# Patient Record
Sex: MALE | Race: WHITE | HISPANIC OR LATINO
[De-identification: names, ages, dates, MRNs, and addresses within clinical notes are randomized per-mention and may not be internally consistent; named-entity substitution may affect disease eponyms.]

---

## 2020-02-27 PROBLEM — Z00.00 ENCOUNTER FOR PREVENTIVE HEALTH EXAMINATION: Status: ACTIVE | Noted: 2020-02-27

## 2020-03-09 ENCOUNTER — APPOINTMENT (OUTPATIENT)
Dept: NEUROSURGERY | Facility: CLINIC | Age: 65
End: 2020-03-09
Payer: MEDICAID

## 2020-03-09 VITALS
RESPIRATION RATE: 16 BRPM | HEART RATE: 70 BPM | BODY MASS INDEX: 26.04 KG/M2 | SYSTOLIC BLOOD PRESSURE: 140 MMHG | OXYGEN SATURATION: 95 % | HEIGHT: 71 IN | TEMPERATURE: 98.1 F | WEIGHT: 186 LBS | DIASTOLIC BLOOD PRESSURE: 79 MMHG

## 2020-03-09 DIAGNOSIS — H54.40 BLINDNESS, ONE EYE, UNSPECIFIED EYE: ICD-10-CM

## 2020-03-09 DIAGNOSIS — Z86.19 PERSONAL HISTORY OF OTHER INFECTIOUS AND PARASITIC DISEASES: ICD-10-CM

## 2020-03-09 DIAGNOSIS — Z82.49 FAMILY HISTORY OF ISCHEMIC HEART DISEASE AND OTHER DISEASES OF THE CIRCULATORY SYSTEM: ICD-10-CM

## 2020-03-09 DIAGNOSIS — Z81.1 FAMILY HISTORY OF ALCOHOL ABUSE AND DEPENDENCE: ICD-10-CM

## 2020-03-09 DIAGNOSIS — M51.26 OTHER INTERVERTEBRAL DISC DISPLACEMENT, LUMBAR REGION: ICD-10-CM

## 2020-03-09 DIAGNOSIS — Z82.5 FAMILY HISTORY OF ASTHMA AND OTHER CHRONIC LOWER RESPIRATORY DISEASES: ICD-10-CM

## 2020-03-09 DIAGNOSIS — Z87.898 PERSONAL HISTORY OF OTHER SPECIFIED CONDITIONS: ICD-10-CM

## 2020-03-09 DIAGNOSIS — F17.200 NICOTINE DEPENDENCE, UNSPECIFIED, UNCOMPLICATED: ICD-10-CM

## 2020-03-09 PROCEDURE — 99204 OFFICE O/P NEW MOD 45 MIN: CPT

## 2020-03-12 PROBLEM — Z82.49 FAMILY HISTORY OF CARDIAC DISORDER: Status: ACTIVE | Noted: 2020-03-12

## 2020-03-12 PROBLEM — F17.200 CURRENT EVERY DAY SMOKER: Status: ACTIVE | Noted: 2020-03-12

## 2020-03-12 PROBLEM — Z87.898 HISTORY OF DRUG USE: Status: ACTIVE | Noted: 2020-03-12

## 2020-03-12 PROBLEM — Z82.5 FAMILY HISTORY OF ASTHMA: Status: ACTIVE | Noted: 2020-03-12

## 2020-03-12 PROBLEM — Z86.19 HISTORY OF HEPATITIS C: Status: RESOLVED | Noted: 2020-03-12 | Resolved: 2020-03-12

## 2020-03-12 PROBLEM — Z81.1 FAMILY HISTORY OF ALCOHOL ABUSE: Status: ACTIVE | Noted: 2020-03-12

## 2020-03-12 PROBLEM — H54.40 BLINDNESS OF LEFT EYE: Status: RESOLVED | Noted: 2020-03-12 | Resolved: 2020-03-12

## 2020-03-12 RX ORDER — KETOCONAZOLE 20 MG/G
2 CREAM TOPICAL
Refills: 0 | Status: ACTIVE | COMMUNITY

## 2020-03-12 RX ORDER — ACETAMINOPHEN 500 MG
500 TABLET ORAL
Refills: 0 | Status: ACTIVE | COMMUNITY

## 2020-03-12 RX ORDER — CLONAZEPAM 2 MG/1
2 TABLET ORAL
Refills: 0 | Status: ACTIVE | COMMUNITY

## 2020-03-12 RX ORDER — METHADONE HCL 100 %
POWDER (GRAM) MISCELLANEOUS
Refills: 0 | Status: ACTIVE | COMMUNITY

## 2020-03-12 RX ORDER — ERGOCALCIFEROL 1.25 MG/1
1.25 MG CAPSULE, LIQUID FILLED ORAL
Refills: 0 | Status: ACTIVE | COMMUNITY

## 2020-03-12 RX ORDER — HALOBETASOL PROPIONATE 0.5 MG/G
0.05 CREAM TOPICAL
Refills: 0 | Status: ACTIVE | COMMUNITY

## 2020-03-31 ENCOUNTER — APPOINTMENT (OUTPATIENT)
Dept: MRI IMAGING | Facility: CLINIC | Age: 65
End: 2020-03-31

## 2020-04-03 ENCOUNTER — OUTPATIENT (OUTPATIENT)
Dept: OUTPATIENT SERVICES | Facility: HOSPITAL | Age: 65
LOS: 1 days | End: 2020-04-03

## 2020-04-03 ENCOUNTER — APPOINTMENT (OUTPATIENT)
Dept: MRI IMAGING | Facility: CLINIC | Age: 65
End: 2020-04-03
Payer: MEDICAID

## 2020-04-03 PROCEDURE — 72146 MRI CHEST SPINE W/O DYE: CPT | Mod: 26

## 2020-04-03 PROCEDURE — 72158 MRI LUMBAR SPINE W/O & W/DYE: CPT | Mod: 26

## 2020-04-03 PROCEDURE — 72141 MRI NECK SPINE W/O DYE: CPT | Mod: 26

## 2020-08-24 ENCOUNTER — APPOINTMENT (OUTPATIENT)
Dept: NEUROSURGERY | Facility: CLINIC | Age: 65
End: 2020-08-24
Payer: MEDICAID

## 2020-08-24 DIAGNOSIS — M54.2 CERVICALGIA: ICD-10-CM

## 2020-08-24 PROCEDURE — 99443: CPT

## 2020-08-24 RX ORDER — MELOXICAM 15 MG/1
15 TABLET ORAL
Qty: 30 | Refills: 1 | Status: ACTIVE | COMMUNITY
Start: 2020-08-24 | End: 1900-01-01

## 2020-08-24 NOTE — REVIEW OF SYSTEMS
[As Noted in HPI] : as noted in HPI [Leg Weakness] : leg weakness [Numbness] : numbness [Dizziness] : dizziness [Tingling] : tingling [Difficulty Walking] : difficulty walking [Inability to Walk] : inability to walk [Joint Pain] : joint pain [Joint Stiffness] : joint stiffness [Limb Pain] : limb pain [Limb Swelling] : limb swelling [Negative] : Genitourinary

## 2020-08-24 NOTE — PHYSICAL EXAM
[General Appearance - Alert] : alert [General Appearance - In No Acute Distress] : in no acute distress [General Appearance - Well Nourished] : well nourished [General Appearance - Well-Appearing] : healthy appearing [Person] : oriented to person [Place] : oriented to place [Time] : oriented to time [Short Term Intact] : short term memory intact

## 2020-08-25 NOTE — HISTORY OF PRESENT ILLNESS
[Medical Office: ([facility])___] : at the medical office located in  [Home] : at home, [unfilled] , at the time of the visit. [Verbal consent obtained from patient] : the patient, [unfilled] [FreeTextEntry1] : Pt 64 y/o male previously treated by me for L5 S1 herniated disc in Nov 2011 at [facility]. Was supposed to have surgery on spine in 2016. . Mainly he has problems in left foot. In the back he has problems bending down and hard to get back up. Fell a few times going cup the stairs. He could not get off of the ground.. Advised not to lift more than 20 lbs. He left pain management because he was allergic to a particular medication and had itching and rash. MRI was done and  discussed the finding with the patient. \par \par Today he participated in a telephonic visit from home.He stays  home as he cannot walk too far and the let foot swells up and he reports that  he can hardly stand up straight. Stuck in house daily except for appointments. Main pain is in the left foot and leg He is been doing PT at home for the past 6 months. Has not been through official  PT recently, he thinks that is little too much and it worsens his pain. Pain management was giving muscle relaxant which did not help. Has not had injections for years. Feels like he is walking on needles and nails all the way up left leg and foot. The pain like fire  shoots down the leg, through the legs and  to the ankle, and the sole . He takes Tylenol and Aleve just make temporary relief. He was given Mobic by pain management, which had helped for months and running out now.

## 2020-08-26 PROBLEM — M54.2 NECK PAIN: Status: ACTIVE | Noted: 2020-08-26

## 2020-09-04 ENCOUNTER — APPOINTMENT (OUTPATIENT)
Dept: CT IMAGING | Facility: CLINIC | Age: 65
End: 2020-09-04

## 2020-09-18 ENCOUNTER — APPOINTMENT (OUTPATIENT)
Dept: CT IMAGING | Facility: IMAGING CENTER | Age: 65
End: 2020-09-18

## 2020-10-28 RX ORDER — MELOXICAM 15 MG/1
15 TABLET ORAL
Qty: 30 | Refills: 1 | Status: ACTIVE | COMMUNITY
Start: 2020-10-28 | End: 1900-01-01

## 2020-11-06 ENCOUNTER — OUTPATIENT (OUTPATIENT)
Dept: OUTPATIENT SERVICES | Facility: HOSPITAL | Age: 65
LOS: 1 days | End: 2020-11-06
Payer: MEDICAID

## 2020-11-06 ENCOUNTER — APPOINTMENT (OUTPATIENT)
Dept: CT IMAGING | Facility: HOSPITAL | Age: 65
End: 2020-11-06
Payer: MEDICAID

## 2020-11-06 ENCOUNTER — RESULT REVIEW (OUTPATIENT)
Age: 65
End: 2020-11-06

## 2020-11-06 DIAGNOSIS — M54.2 CERVICALGIA: ICD-10-CM

## 2020-11-06 DIAGNOSIS — M51.26 OTHER INTERVERTEBRAL DISC DISPLACEMENT, LUMBAR REGION: ICD-10-CM

## 2020-11-06 PROCEDURE — 72125 CT NECK SPINE W/O DYE: CPT | Mod: 26

## 2020-11-06 PROCEDURE — 72131 CT LUMBAR SPINE W/O DYE: CPT

## 2020-11-06 PROCEDURE — 72131 CT LUMBAR SPINE W/O DYE: CPT | Mod: 26

## 2020-11-06 PROCEDURE — 72125 CT NECK SPINE W/O DYE: CPT

## 2020-12-04 ENCOUNTER — APPOINTMENT (OUTPATIENT)
Dept: NEUROSURGERY | Facility: CLINIC | Age: 65
End: 2020-12-04
Payer: MEDICAID

## 2020-12-04 VITALS
DIASTOLIC BLOOD PRESSURE: 102 MMHG | HEART RATE: 66 BPM | RESPIRATION RATE: 17 BRPM | OXYGEN SATURATION: 96 % | HEIGHT: 71 IN | SYSTOLIC BLOOD PRESSURE: 162 MMHG | TEMPERATURE: 97.8 F | WEIGHT: 186 LBS | BODY MASS INDEX: 26.04 KG/M2

## 2020-12-04 PROCEDURE — 99214 OFFICE O/P EST MOD 30 MIN: CPT

## 2020-12-04 PROCEDURE — 99072 ADDL SUPL MATRL&STAF TM PHE: CPT

## 2020-12-04 RX ORDER — MELOXICAM 15 MG/1
15 TABLET ORAL DAILY
Qty: 30 | Refills: 2 | Status: ACTIVE | COMMUNITY
Start: 2020-12-04 | End: 1900-01-01

## 2020-12-04 NOTE — REVIEW OF SYSTEMS
[Anxiety] : anxiety [As Noted in HPI] : as noted in HPI [Leg Weakness] : leg weakness [Numbness] : numbness [Tingling] : tingling [Difficulty Walking] : difficulty walking [Joint Pain] : joint pain [Limb Pain] : limb pain [Negative] : Heme/Lymph

## 2020-12-10 NOTE — ASSESSMENT
[FreeTextEntry1] : Impression:\par Pt is with prior h/o S1 radiculopathy, have burning dysesthesia of left foot. He has Chronic pain, Has left ankle  clonus and diffuse hyperreflexia in left patellar. He experiences severe pain with difficulty walking and burning foot and weakness of his leg. MRI showed a small disc herniation with surrounding scar tissue.\par \par Plan:\par Suggested that there is a choice of surgical interventions.  One is to perform Left L5 S1 facetectomy, foraminotomy, the other would be to perform L5S1, possibly including L45 interbody and posterior lateral fusions.\par Risks related of surgery discussed including but not limited to CSF leak, infection, nerve damage, temporary or permanent weakness or numbness, failure to relief symptoms,  hemorrhage, but patient did not allow continuation of the discussion since he said that he would rather continue conservative management; pt has been having improvement in pain with meloxicam.  Perhaps, now , PT would be more effective. \par \par Continue Meloxicam 15 mg I tab daily PRN for pain.\par PT for back for strengthening and modalities , 2-3 times/week x 8 weeks. \par Advised to follow up with PCP to evaluate his Blood Pressure. \par Follow up in 3 months.\par

## 2020-12-10 NOTE — RESULTS/DATA
[FreeTextEntry1] : EXAM: CT LUMBAR SPINE\par \par \par PROCEDURE DATE: 11/06/2020\par \par \par \par INTERPRETATION: CT lumbar spine without contrast\par \par History low back pain\par \par There is no fracture or compression or subluxation or osseous destruction or demineralization or pars defect.\par \par The L1-L2 and L2-3 levels are within normal limits\par \par The L3-L4 disc space is preserved. There is mild degenerative facet change without spinal canal or foraminal stenosis\par \par The L4-5 disc space is preserved. There is moderate right and mild left facet ligamentous hypertrophy together contributing to mild spinal stenosis\par \par There is severe degenerative disc change with vacuum at L5-S1. There is moderate bilateral facet hypertrophy without spinal canal or foraminal stenosis. [mild air in left facet joint]\par \par IMPRESSION:\par No acute findings. Facet related Spondylosis contributing to mild spinal stenosis at L4-5 as above\par \par \par EXAM: CT CERVICAL SPINE\par \par \par PROCEDURE DATE: 11/06/2020\par \par \par \par INTERPRETATION: CT cervical spine without contrast\par \par History neck pain\par \par There is mild mid cervical dextroscoliosis without fracture or compression or subluxation or prevertebral soft tissue widening or osseous demineralization or destruction. There is moderate degenerative disc space narrowing at C2-3 without spinal canal or foraminal stenosis.\par \par There is moderate degenerative disc space narrowing and mild dorsal osteophyte at C3-4 with bilateral uncinate hypertrophy overall resulting in mild spinal canal and moderate bilateral foraminal stenosis\par \par There is severe degenerative disc change at C4-5 with mild broad dorsal osteophyte and moderate uncinate hypertrophy overall resulting in mild spinal stenosis and moderate bilateral foraminal stenosis, worse on the left\par \par There is severe degenerative disc change at C5-C6. There is uncinate hypertrophy resulting in moderate bilateral foraminal stenosis without central canal stenosis\par \par There is previous fusion or ankylosis at C6-7. Uncinate hypertrophy results in mild right-sided foraminal stenosis without central canal stenosis. The C7-T1 level is within normal limits.\par \par IMPRESSION:\par Spondylosis contributing to multilevel spinal canal and foraminal stenosis as detailed above.\par

## 2020-12-10 NOTE — HISTORY OF PRESENT ILLNESS
[FreeTextEntry1] : Pt 64 y/o male previously treated by me for L5 S1 herniated disc in Nov 2011 at [facility]. Was supposed to have surgery on spine in 2016. . Mainly he has problems in left foot. In the back he has problems bending down and hard to get back up. Fell a few times going cup the stairs. He could not get off of the ground.. Advised not to lift more than 20 lbs. He left pain management because he was allergic to a particular medication and had itching and rash. MRI was done and  discussed the finding with the patient. \par \par He came in with a rolling walker, c/o lower back pain and leg pain. He also report that   he cannot walk too far and the let foot swells up and he reports that he can hardly stand up straight.  Main pain is in the left foot and leg.  He is been doing PT at home for the past 6 months. Has not been through official PT recently, he thinks that is little too much and it worsens his pain. Has not had injections for years. Feels like he is walking on needles and nails all the way up left leg and foot. The pain like fire shoots down the leg, through the legs and to the ankle, and the sole mainly to the left leg. He takes Tylenol and Aleve just make temporary relief. He is taking  Mobic for pain which is helping. His pain in s 70% leg 30% back.\par \par Pt has a high BP today and was told that he usually have good numbers, but he will follow up with his PCP to evaluate his hypertensive status.

## 2020-12-10 NOTE — PHYSICAL EXAM
[General Appearance - Alert] : alert [General Appearance - In No Acute Distress] : in no acute distress [General Appearance - Well Nourished] : well nourished [General Appearance - Well-Appearing] : healthy appearing [Oriented To Time, Place, And Person] : oriented to person, place, and time [Impaired Insight] : insight and judgment were intact [Affect] : the affect was normal [Memory Recent] : recent memory was not impaired [Person] : oriented to person [Place] : oriented to place [Time] : oriented to time [Short Term Intact] : short term memory intact [Cranial Nerves Optic (II)] : visual acuity intact bilaterally,  pupils equal round and reactive to light [Cranial Nerves Oculomotor (III)] : extraocular motion intact [Cranial Nerves Trigeminal (V)] : facial sensation intact symmetrically [Cranial Nerves Facial (VII)] : face symmetrical [Cranial Nerves Vestibulocochlear (VIII)] : hearing was intact bilaterally [Cranial Nerves Accessory (XI - Cranial And Spinal)] : head turning and shoulder shrug symmetric [Motor Tone] : muscle tone was normal in all four extremities [Motor Strength] : muscle strength was normal in all four extremities [Sensation Tactile Decrease] : light touch was intact [Sensation Pain / Temperature Decrease] : pain and temperature was intact [Balance] : balance was intact [3+] : Patella left 3+ [2+] : Ankle jerk right 2+ [0] : Ankle jerk left 0 [___] : [unfilled] ~Ubeats present on the left [No Visual Abnormalities] : no visible abnormailities [Normal Lordosis] : normal lordosis [Full ROM] : full ROM [No Pain with ROM] : no pain with motion in any direction [Sclera] : the sclera and conjunctiva were normal [PERRL With Normal Accommodation] : pupils were equal in size, round, reactive to light, with normal accommodation [Outer Ear] : the ears and nose were normal in appearance [Neck Appearance] : the appearance of the neck was normal [] : no respiratory distress [Apical Impulse] : the apical impulse was normal [Heart Rate And Rhythm] : heart rate was normal and rhythm regular [No CVA Tenderness] : no ~M costovertebral angle tenderness [Abnormal Walk] : normal gait [Involuntary Movements] : no involuntary movements were seen [Plantar Reflex Left Only] : normal on the left [Able to toe walk] : the patient was not able to toe walk [Able to heel walk] : the patient was not able to heel walk

## 2021-03-12 ENCOUNTER — APPOINTMENT (OUTPATIENT)
Dept: NEUROSURGERY | Facility: CLINIC | Age: 66
End: 2021-03-12
Payer: MEDICAID

## 2021-03-12 VITALS
TEMPERATURE: 97.9 F | OXYGEN SATURATION: 95 % | HEART RATE: 58 BPM | BODY MASS INDEX: 26.04 KG/M2 | WEIGHT: 186 LBS | RESPIRATION RATE: 18 BRPM | DIASTOLIC BLOOD PRESSURE: 98 MMHG | HEIGHT: 71 IN | SYSTOLIC BLOOD PRESSURE: 152 MMHG

## 2021-03-12 PROCEDURE — 99072 ADDL SUPL MATRL&STAF TM PHE: CPT

## 2021-03-12 PROCEDURE — 99215 OFFICE O/P EST HI 40 MIN: CPT

## 2021-03-12 RX ORDER — MELOXICAM 15 MG/1
15 TABLET ORAL DAILY
Qty: 30 | Refills: 2 | Status: ACTIVE | COMMUNITY
Start: 2021-03-12 | End: 1900-01-01

## 2021-03-12 NOTE — REVIEW OF SYSTEMS
[Leg Weakness] : leg weakness [Numbness] : numbness [Joint Pain] : joint pain [Limb Pain] : limb pain [Negative] : Endocrine

## 2021-03-12 NOTE — PHYSICAL EXAM
[General Appearance - Alert] : alert [General Appearance - In No Acute Distress] : in no acute distress [General Appearance - Well Nourished] : well nourished [General Appearance - Well-Appearing] : healthy appearing [Oriented To Time, Place, And Person] : oriented to person, place, and time [Impaired Insight] : insight and judgment were intact [Affect] : the affect was normal [Memory Recent] : recent memory was not impaired [Person] : oriented to person [Place] : oriented to place [Time] : oriented to time [Cranial Nerves Optic (II)] : visual acuity intact bilaterally,  pupils equal round and reactive to light [Cranial Nerves Oculomotor (III)] : extraocular motion intact [Cranial Nerves Trigeminal (V)] : facial sensation intact symmetrically [Cranial Nerves Facial (VII)] : face symmetrical [Cranial Nerves Vestibulocochlear (VIII)] : hearing was intact bilaterally [Cranial Nerves Accessory (XI - Cranial And Spinal)] : head turning and shoulder shrug symmetric [Sensation Tactile Decrease] : light touch was intact [Sensation Pain / Temperature Decrease] : pain and temperature was intact [Balance] : balance was intact [2+] : Brachioradialis left 2+ [Sclera] : the sclera and conjunctiva were normal [PERRL With Normal Accommodation] : pupils were equal in size, round, reactive to light, with normal accommodation [Outer Ear] : the ears and nose were normal in appearance [Both Tympanic Membranes Were Examined] : both tympanic membranes were normal [Neck Appearance] : the appearance of the neck was normal [] : no respiratory distress [Respiration, Rhythm And Depth] : normal respiratory rhythm and effort [Apical Impulse] : the apical impulse was normal [Heart Rate And Rhythm] : heart rate was normal and rhythm regular [Arterial Pulses Carotid] : carotid pulses were normal with no bruits [Abdominal Aorta] : the abdominal aorta was normal [Bowel Sounds] : normal bowel sounds [Abdomen Soft] : soft [No CVA Tenderness] : no ~M costovertebral angle tenderness [Abnormal Walk] : normal gait [Involuntary Movements] : no involuntary movements were seen [Skin Color & Pigmentation] : normal skin color and pigmentation [3+] : Patella left 3+ [0] : Ankle jerk left 0 [___] : [unfilled] ~Ubeats present on the left

## 2021-03-17 NOTE — REASON FOR VISIT
[Follow-Up: _____] : a [unfilled] follow-up visit [FreeTextEntry1] : Pt 64 y/o male previously treated for L5 S1 herniated disc in Nov 2011 at [facility]. Was supposed to have surgery on spine in 2016. . He has main  problem  in left foot. In the back he has problems bending down and hard to get back up. Fell a few times going up the stairs. He left pain management because he was allergic to a particular medication and had itching and rash. MRI was done and  discussed the finding with the patient. Pt last seen on 12/4/2020 and suggested surgical correction, but pat would like to continue PT and analgesic.\par \par Today he is here with  a cane support, c/o lower back pain and leg pain. He also report that he cannot walk too far and the let foot swells up and he reports that he can hardly stand up straight. He has pain and spasm in legs and back. It occasionally to both sides.  Main pain is in the left foot and leg than the back. 70% leg 30% back. He has not done any PT yet. . Has not had injections for years. Feels like he is walking on needles and nails all the way up left leg and foot. The pa He is taking Mobic for pain which is helping.\par \par

## 2021-06-11 ENCOUNTER — APPOINTMENT (OUTPATIENT)
Dept: NEUROSURGERY | Facility: CLINIC | Age: 66
End: 2021-06-11
Payer: MEDICAID

## 2021-06-11 VITALS
DIASTOLIC BLOOD PRESSURE: 80 MMHG | TEMPERATURE: 98 F | HEART RATE: 74 BPM | BODY MASS INDEX: 35.12 KG/M2 | WEIGHT: 186 LBS | SYSTOLIC BLOOD PRESSURE: 179 MMHG | OXYGEN SATURATION: 91 % | HEIGHT: 61 IN

## 2021-06-11 PROCEDURE — 99215 OFFICE O/P EST HI 40 MIN: CPT

## 2021-06-11 RX ORDER — MELOXICAM 15 MG/1
15 TABLET ORAL DAILY
Qty: 30 | Refills: 2 | Status: ACTIVE | COMMUNITY
Start: 2021-06-11 | End: 1900-01-01

## 2021-06-11 NOTE — PHYSICAL EXAM
[General Appearance - Alert] : alert [General Appearance - Well Nourished] : well nourished [General Appearance - Well-Appearing] : healthy appearing [Oriented To Time, Place, And Person] : oriented to person, place, and time [Impaired Insight] : insight and judgment were intact [Affect] : the affect was normal [Memory Recent] : recent memory was not impaired [Person] : oriented to person [Place] : oriented to place [Time] : oriented to time [Short Term Intact] : short term memory intact [Cranial Nerves Optic (II)] : visual acuity intact bilaterally,  pupils equal round and reactive to light [Cranial Nerves Oculomotor (III)] : extraocular motion intact [Cranial Nerves Trigeminal (V)] : facial sensation intact symmetrically [Cranial Nerves Facial (VII)] : face symmetrical [Cranial Nerves Vestibulocochlear (VIII)] : hearing was intact bilaterally [Cranial Nerves Glossopharyngeal (IX)] : tongue and palate midline [Cranial Nerves Accessory (XI - Cranial And Spinal)] : head turning and shoulder shrug symmetric [Cranial Nerves Hypoglossal (XII)] : there was no tongue deviation with protrusion [Motor Tone] : muscle tone was normal in all four extremities [Motor Strength] : muscle strength was normal in all four extremities [Involuntary Movements] : no involuntary movements were seen [Sensation Tactile Decrease] : light touch was intact [Sensation Pain / Temperature Decrease] : pain and temperature was intact [Abnormal Walk] : normal gait [Balance] : balance was intact [2+] : Triceps left 2+ [3+] : Ankle jerk left 3+ [___] : [unfilled] ~Ubeats present on the left [Sclera] : the sclera and conjunctiva were normal [PERRL With Normal Accommodation] : pupils were equal in size, round, reactive to light, with normal accommodation [Outer Ear] : the ears and nose were normal in appearance [Both Tympanic Membranes Were Examined] : both tympanic membranes were normal [Neck Appearance] : the appearance of the neck was normal [] : no respiratory distress [Respiration, Rhythm And Depth] : normal respiratory rhythm and effort [Apical Impulse] : the apical impulse was normal [Heart Rate And Rhythm] : heart rate was normal and rhythm regular [Arterial Pulses Carotid] : carotid pulses were normal with no bruits [Abdominal Aorta] : the abdominal aorta was normal [No CVA Tenderness] : no ~M costovertebral angle tenderness [Musculoskeletal - Swelling] : no joint swelling seen [Skin Color & Pigmentation] : normal skin color and pigmentation [Mariano] : Mariano's sign was demonstrated [___] : absent on the right [FreeTextEntry9] : lower hyperreflexia [de-identified] : b/l Montserrat +ve  [Able to toe walk] : the patient was not able to toe walk [Able to heel walk] : the patient was not able to heel walk [FreeTextEntry1] : gait alternating and symmetrical with walker.

## 2021-06-11 NOTE — REVIEW OF SYSTEMS
[Leg Weakness] : leg weakness [Numbness] : numbness [Tingling] : tingling [Difficulty Walking] : difficulty walking [Inability to Walk] : inability to walk [Joint Pain] : joint pain [Limb Pain] : limb pain [Negative] : Endocrine

## 2021-06-11 NOTE — HISTORY OF PRESENT ILLNESS
[FreeTextEntry1] : Pt 64 y/o male previously treated for L5 S1 herniated disc in Nov 2011 at [facility]. Was supposed to have surgery on spine in 2016. . He has main problem in left foot. In the back he has problems bending down and hard to get back up. Fell a few times going up the stairs. He left pain management because he was allergic to a particular medication and had itching and rash. MRI was done and discussed the finding with the patient. Pt last seen on 12/4/2020 and suggested surgical correction, but pat would like to continue PT and analgesic.\par \par Today he is here with a roller walker c/o lower back pain and leg pain. He still has pain in the lower back more towards to right. and radiating down to left leg to the left foot and toes mainly to lateral part of the foot and the left  lateral 3 toes are affected with heel pain. He also report that he cannot walk too far and the let foot swells up and he reports that he can hardly stand up straight. He has pain and spasm in legs and back. It occasionally to both sides.  70% leg 30% back. He has not done any PT yet.  He is on taking Mobic for pain which is helping.\par \par

## 2021-06-11 NOTE — ASSESSMENT
[FreeTextEntry1] : Impression:\par Pt is with chronic h/o S1 radiculopathy, have burning dysesthesia of left foot. He experiences severe lower back  pain with difficulty walking and burning foot and weakness of his leg. Pt with  left ankle clonus and diffuse hyperreflexia in patellar with bilateral Mariano. MRI L spine with  small disc herniation with surrounding scar tissue. Reviewed also c and t-spine MRI from last year and  c-spine shows spondylosis with canal narrowing anterior and posteriorly at  C34 with mild cord compression. Pt was suggested surgical management for lumbar spine and he would like to continue PT for now. \par \par Plan:\par MRI C spine no contrast\par MRI Lumbar spine w+w/o contrast \par Continue Meloxicam 15 mg I tab daily PRN for pain.\par PT for back for strengthening and modalities , 2-3 times/week x 8 weeks. Pt not so favor of doing PT.  \par Follow up after imaging.

## 2021-08-27 ENCOUNTER — OUTPATIENT (OUTPATIENT)
Dept: OUTPATIENT SERVICES | Facility: HOSPITAL | Age: 66
LOS: 1 days | End: 2021-08-27
Payer: MEDICAID

## 2021-08-27 ENCOUNTER — RESULT REVIEW (OUTPATIENT)
Age: 66
End: 2021-08-27

## 2021-08-27 ENCOUNTER — APPOINTMENT (OUTPATIENT)
Dept: MRI IMAGING | Facility: HOSPITAL | Age: 66
End: 2021-08-27

## 2021-08-27 PROCEDURE — 72158 MRI LUMBAR SPINE W/O & W/DYE: CPT

## 2021-08-27 PROCEDURE — 72158 MRI LUMBAR SPINE W/O & W/DYE: CPT | Mod: 26

## 2021-08-27 PROCEDURE — 72141 MRI NECK SPINE W/O DYE: CPT | Mod: 26

## 2021-08-27 PROCEDURE — A9585: CPT

## 2021-08-27 PROCEDURE — 72141 MRI NECK SPINE W/O DYE: CPT

## 2021-09-16 RX ORDER — MELOXICAM 15 MG/1
15 TABLET ORAL DAILY
Qty: 30 | Refills: 2 | Status: ACTIVE | COMMUNITY
Start: 2021-09-16 | End: 1900-01-01

## 2021-10-15 ENCOUNTER — APPOINTMENT (OUTPATIENT)
Dept: NEUROSURGERY | Facility: CLINIC | Age: 66
End: 2021-10-15
Payer: MEDICAID

## 2021-10-15 VITALS
BODY MASS INDEX: 26.04 KG/M2 | DIASTOLIC BLOOD PRESSURE: 87 MMHG | WEIGHT: 186 LBS | HEIGHT: 71 IN | OXYGEN SATURATION: 95 % | SYSTOLIC BLOOD PRESSURE: 130 MMHG | HEART RATE: 64 BPM

## 2021-10-15 DIAGNOSIS — M54.42 LUMBAGO WITH SCIATICA, LEFT SIDE: ICD-10-CM

## 2021-10-15 DIAGNOSIS — R10.9 UNSPECIFIED ABDOMINAL PAIN: ICD-10-CM

## 2021-10-15 PROCEDURE — 99214 OFFICE O/P EST MOD 30 MIN: CPT

## 2021-10-15 RX ORDER — MELOXICAM 15 MG/1
15 TABLET ORAL DAILY
Qty: 30 | Refills: 2 | Status: ACTIVE | COMMUNITY
Start: 2021-10-15 | End: 1900-01-01

## 2021-10-15 NOTE — REVIEW OF SYSTEMS
[Leg Weakness] : leg weakness [Numbness] : numbness [Tingling] : tingling [Difficulty Walking] : difficulty walking [Inability to Walk] : inability to walk [Negative] : Heme/Lymph

## 2021-10-15 NOTE — PHYSICAL EXAM
[General Appearance - Alert] : alert [General Appearance - In No Acute Distress] : in no acute distress [General Appearance - Well Nourished] : well nourished [General Appearance - Well-Appearing] : healthy appearing [Oriented To Time, Place, And Person] : oriented to person, place, and time [Impaired Insight] : insight and judgment were intact [Memory Recent] : recent memory was not impaired [Affect] : the affect was normal [Person] : oriented to person [Place] : oriented to place [Time] : oriented to time [Short Term Intact] : short term memory intact [Motor Tone] : muscle tone was normal in all four extremities [Motor Strength] : muscle strength was normal in all four extremities [Sensation Tactile Decrease] : light touch was intact [Sensation Pain / Temperature Decrease] : pain and temperature was intact [Balance] : balance was intact [2+] : Brachioradialis left 2+ [3+] : Ankle jerk left 3+ [Sclera] : the sclera and conjunctiva were normal [PERRL With Normal Accommodation] : pupils were equal in size, round, reactive to light, with normal accommodation [Outer Ear] : the ears and nose were normal in appearance [Both Tympanic Membranes Were Examined] : both tympanic membranes were normal [Neck Appearance] : the appearance of the neck was normal [] : no respiratory distress [Respiration, Rhythm And Depth] : normal respiratory rhythm and effort [Apical Impulse] : the apical impulse was normal [Heart Rate And Rhythm] : heart rate was normal and rhythm regular [Arterial Pulses Carotid] : carotid pulses were normal with no bruits [Abdominal Aorta] : the abdominal aorta was normal [No CVA Tenderness] : no ~M costovertebral angle tenderness [No Spinal Tenderness] : no spinal tenderness [Abnormal Walk] : normal gait [Involuntary Movements] : no involuntary movements were seen [Skin Color & Pigmentation] : normal skin color and pigmentation

## 2021-10-24 NOTE — ASSESSMENT
[FreeTextEntry1] : Impression:\par Pt is with chronic h/o S1 radiculopathy, have burning dysesthesia of left foot. He experiences severe lower back pain with difficulty walking and burning foot and weakness of his leg. Pt with left ankle clonus and diffuse hyperreflexia in patellar with bilateral Mariano. New MRI L spine with small disc herniation with surrounding scar tissue. Reviewed new C spine MRI  shows spondylosis with canal narrowing anterior and posteriorly at  mild cord compression C34 and multilevel spondylosis. Pt was suggested surgical management for lumbar spine and he would like to continue PT for now. \par \par Plan:\par \par Continue Meloxicam 15 mg I tab daily PRN for pain.\par PT for back for strengthening and modalities , 2-3 times/week x 8 weeks. Pt not so favor of doing PT. \par Referred to pain management for possible L5- S1 Nerve block, and the management of SIJ pain.\par US Abdomen for pain abdomen , on long term NSAIDs. \par Follow up in 2 months

## 2021-10-24 NOTE — RESULTS/DATA
[FreeTextEntry1] : EXAM: MR SPINE LUMBAR WAW IC\par \par PROCEDURE DATE: 08/27/2021\par \par \par \par INTERPRETATION: PROCEDURE: MRI of the lumbosacral spine with and without contrast\par \par INDICATION: Low back pain with radiculopathy with lower hyperreflexia and left left ankle conus\par \par TECHNIQUE: Sagittal T1, T2, STIR and axial T2 weighted images of the lumbosacral spine were obtained. Following the intravenous administration of 7.5 ml of Gadavist, sagittal and axial T1 weighted images of the spine were obtained. Some of the images are degraded by motion artifact.\par \par COMPARISON: MRI lumbar spine 4/3/2020 and CT11/6/2020\par \par FINDINGS: The MRI examination demonstrates approximately 2 mm of retrolisthesis of L5 on S1. There is moderate loss of intervertebral disc space height at the L5/S1 level with disc desiccation. The vertebral body heights and rest of the intervertebral disc spaces are maintained. There is a large anterior bridging osteophyte at T12/L1. There are minimum degenerative endplate changes at L5/S1. The rest of the vertebral body marrow signal is appropriate for the patient's stated age. The conus medullaris ends at T12/L1.\par \par At the L1/2 level, there is no disc herniation. There is no spinal canal stenosis or neural foramen narrowing.\par \par At the L2/3 level, there is no disc herniation. There is no spinal canal stenosis or neural foramen narrowing.\par \par At the L3/4 level, there is minium disc bulge. There are degenerative changes involving the facets bilaterally (right greater than left). There is enhancement of the right facet and juxta-articular soft tissues which may represent inflammation or degenerative changes. There is no abnormal fluid within the facet joints. There is mild central spinal canal stenosis. There is no neural foramen narrowing.\par \par At the L4/5 level, there is minimum disc bulge. There are degenerative changes involving the facets bilaterally (right greater than left). There is enhancement of the right facet and juxta-articular soft tissues which may represent inflammation or degenerative changes. There is no fluid within the facet joints. There also is mild ligamentum flavum hypertrophy. This combination results in mild central spinal canal stenosis. There is mild right neural foramen narrowing.\par \par At the L5/S1 level, the patient is status post left hemilaminectomy. There is a left-sided small focus of enhancing soft tissue which indents on the left S1 nerve root (series 12 image 36 and series 10 image 4). This is felt to represent scarring rather than disc material. There are degenerative changes involving the facets bilaterally. There is mild left neural foramen narrowing.\par \par IMPRESSION: Patient status post left hemilaminectomy at L5/S1 with left-sided ventral enhancing soft tissue indenting on the left S1 nerve root compatible with scar tissue. Enhancement of the right L3/4 and right L4/5 facets which may be inflammatory or degenerative.\par \par EXAM: MR SPINE CERVICAL\par \par PROCEDURE DATE: 08/27/2021\par \par \par \par INTERPRETATION: PROCEDURE: MRI cervical spine without contrast\par \par INDICATION: Neck pain with radiculopathy\par \par TECHNIQUE: Sagittal T1, T2, STIR and axial T1, T2 and gradient echo images of the cervical spine were obtained.\par \par COMPARISON: MRI Cervical spine 4/3/2020; CT cervical spine 11/6/2020\par \par FINDINGS: The MRI exam demonstrates loss of the normal cervical lordosis which may be secondary to positioning. There is loss of intervertebral disc space height at the C4/5 through the C6/7 levels. There is hyperintense T1 and T2 signal within the C6/7 disc space which corresponds to calcification on the previous CT. The vertebral body heights and rest of the intervertebral disc spaces are maintained. There is mild anterior osteophytic lipping at C4/5 and C5/6. There is a small well-circumscribed focus of hyperintense T1 and T2 signal within the C4 vertebral body which may represent a hemangioma. The rest of the vertebral body marrow signal is appropriate for the patient's stated age. No abnormal signal is identified within the cervical cord. The prevertebral soft tissues are within normal limits. The cerebellar tonsils are normal in position.\par \par At the C2/3 level, there is disc herniation. There is no central spinal canal stenosis or neural foramen narrowing.\par \par At the C3/4 level, there is disc bulge with interval compression of large central disc herniation indenting the thecal sac and mildly compressing the spinal cord. There is bilateral uncovertebral joint and degenerative facet disease as well as mild ligamentum flavum hypertrophy. This combination results in moderate central spinal canal stenosis. There is severe bilateral neural foramen narrowing.\par \par At the C4/5 level, there is disc bulge indenting the thecal sac. There is bilateral uncovertebral joint and degenerative facet disease. This combination results in mild central spinal canal stenosis. There is severe bilateral neural foramen narrowing.\par \par At the C5/6 level, there is disc bulge indenting the thecal sac. There is bilateral uncovertebral joint and degenerative facet disease. There is mild spinal canal stenosis. There is severe bilateral neural foramen narrowing.\par \par At the C6/7 level, there is osseous ridging indenting the thecal sac. There is bilateral uncovertebral joint and degenerative facet disease. There is no spinal canal stenosis. There is severe right and mild left neural foramen narrowing.\par \par At the C7/T1 level, there is minimum disc bulge. There is no central spinal canal stenosis or neural foramen narrowing.\par \par IMPRESSION: Multilevel degenerative disc disease with interval progression of central disc herniation at the C3/4 level. Otherwise, stable exam.\par

## 2021-10-24 NOTE — HISTORY OF PRESENT ILLNESS
[FreeTextEntry1] : Pt 64 y/o male previously treated for L5 S1 herniated disc in Nov 2011 at [facility]. Was supposed to have surgery on spine in 2016. . He has main problem in left foot. In the back he has problems bending down and hard to get back up. Fell a few times going up the stairs. He left pain management because he was allergic to a particular medication and had itching and rash. MRI was done and discussed the finding with the patient. Pt last seen on 12/4/2020 and suggested surgical correction, but pat would like to continue PT and analgesic.\par \par He  returns for a follow up for his back pain. He still has pain in the lower back more towards to left and radiating down to left leg to the left foot and toes mainly to lateral part of the foot and the left lateral 3 toes are affected with heel pain. He also c/o pain in the posterior aspect of his left leg. He also report that he cannot walk too far and the let foot swells up and he reports that he can hardly stand up straight.he uses his rolling walker all the time.  He also c/o spasm in legs and taking Mobic with relief.   He has not done any PT as he can not tolerate the pain.

## 2021-10-29 ENCOUNTER — APPOINTMENT (OUTPATIENT)
Dept: NEUROSURGERY | Facility: CLINIC | Age: 66
End: 2021-10-29

## 2021-12-10 ENCOUNTER — APPOINTMENT (OUTPATIENT)
Dept: NEUROSURGERY | Facility: CLINIC | Age: 66
End: 2021-12-10
Payer: MEDICAID

## 2021-12-10 VITALS
WEIGHT: 176 LBS | DIASTOLIC BLOOD PRESSURE: 97 MMHG | HEIGHT: 71 IN | HEART RATE: 73 BPM | SYSTOLIC BLOOD PRESSURE: 142 MMHG | BODY MASS INDEX: 24.64 KG/M2 | OXYGEN SATURATION: 96 %

## 2021-12-10 PROCEDURE — 99213 OFFICE O/P EST LOW 20 MIN: CPT

## 2021-12-10 RX ORDER — MELOXICAM 15 MG/1
15 TABLET ORAL
Qty: 30 | Refills: 2 | Status: ACTIVE | COMMUNITY
Start: 2021-12-10 | End: 1900-01-01

## 2022-03-18 ENCOUNTER — APPOINTMENT (OUTPATIENT)
Dept: NEUROSURGERY | Facility: CLINIC | Age: 67
End: 2022-03-18
Payer: MEDICAID

## 2022-03-18 VITALS
HEIGHT: 71 IN | BODY MASS INDEX: 24.64 KG/M2 | SYSTOLIC BLOOD PRESSURE: 145 MMHG | WEIGHT: 176 LBS | TEMPERATURE: 97.5 F | DIASTOLIC BLOOD PRESSURE: 80 MMHG | OXYGEN SATURATION: 98 % | HEART RATE: 62 BPM

## 2022-03-18 PROCEDURE — 99212 OFFICE O/P EST SF 10 MIN: CPT

## 2022-03-18 RX ORDER — MELOXICAM 15 MG/1
15 TABLET ORAL
Qty: 30 | Refills: 5 | Status: ACTIVE | COMMUNITY
Start: 2022-03-18 | End: 1900-01-01

## 2022-06-24 ENCOUNTER — APPOINTMENT (OUTPATIENT)
Dept: NEUROSURGERY | Facility: CLINIC | Age: 67
End: 2022-06-24
Payer: MEDICARE

## 2022-06-24 VITALS
WEIGHT: 160 LBS | DIASTOLIC BLOOD PRESSURE: 92 MMHG | HEART RATE: 65 BPM | HEIGHT: 71 IN | BODY MASS INDEX: 22.4 KG/M2 | OXYGEN SATURATION: 96 % | SYSTOLIC BLOOD PRESSURE: 164 MMHG

## 2022-06-24 PROCEDURE — 99214 OFFICE O/P EST MOD 30 MIN: CPT

## 2022-06-24 RX ORDER — NAPROXEN SODIUM 220 MG
TABLET ORAL
Refills: 0 | Status: DISCONTINUED | COMMUNITY
End: 2022-06-24

## 2022-06-24 NOTE — REVIEW OF SYSTEMS
[Numbness] : numbness [Tingling] : tingling [Difficulty Walking] : difficulty walking [Joint Pain] : joint pain [Limb Pain] : limb pain [Negative] : Endocrine

## 2022-06-28 NOTE — HISTORY OF PRESENT ILLNESS
[FreeTextEntry1] : Pt 64 y/o male previously treated for L5- S1 herniated disc in Nov 2011 at [facility]. Was supposed to have surgery on spine in 2016, but deferred it and dong conservative management. \par \par \par Today Pt reports that his pain goes from the left foot radiating to up and goes to his left hip. Every day foot is hurting and has a brace which helps little bit. Pt has a podiatrist who comes home and check on his feet issues.   Pt uses for cane and walks short distances.He had worked with  Insurance and now ready to go to PT.  Pt take Meloxicam 15 mg and Methadone 55 mg with relief. Pt is not interested in surgery at all as he was seen pt  suffering after back surgery.  PT feels that he needs the assistive device to walk. . With cane, he walks 1/2 block, but if  walk longer he has to use walker. Has a home attendant who help him to clean and cook.

## 2022-06-28 NOTE — PHYSICAL EXAM
[General Appearance - Alert] : alert [General Appearance - In No Acute Distress] : in no acute distress [General Appearance - Well Nourished] : well nourished [General Appearance - Well-Appearing] : healthy appearing [Oriented To Time, Place, And Person] : oriented to person, place, and time [Impaired Insight] : insight and judgment were intact [Affect] : the affect was normal [Memory Recent] : recent memory was not impaired [Person] : oriented to person [Place] : oriented to place [Time] : oriented to time [Short Term Intact] : short term memory intact [Motor Tone] : muscle tone was normal in all four extremities [Sensation Tactile Decrease] : light touch was intact [Sensation Pain / Temperature Decrease] : pain and temperature was intact [Balance] : balance was intact [Sclera] : the sclera and conjunctiva were normal [PERRL With Normal Accommodation] : pupils were equal in size, round, reactive to light, with normal accommodation [Neck Appearance] : the appearance of the neck was normal [] : no respiratory distress [Respiration, Rhythm And Depth] : normal respiratory rhythm and effort [Apical Impulse] : the apical impulse was normal [Heart Rate And Rhythm] : heart rate was normal and rhythm regular [Arterial Pulses Carotid] : carotid pulses were normal with no bruits [Abdominal Aorta] : the abdominal aorta was normal [No CVA Tenderness] : no ~M costovertebral angle tenderness [No Spinal Tenderness] : no spinal tenderness [Abnormal Walk] : normal gait [Involuntary Movements] : no involuntary movements were seen [Skin Color & Pigmentation] : normal skin color and pigmentation [2+] : Brachioradialis left 2+ [1+] : Ankle jerk left 1+ [FreeTextEntry8] : use cane to walk, minimally antalgic

## 2022-06-28 NOTE — ASSESSMENT
[FreeTextEntry1] : IMPRESSION:\par Pt 68 y/o male with chronic back pain with  L5- S1 herniated disc who was treated in Nov 2011 at [facility].  Pt continues with left leg radicular pain and foot burning and numbness.  Pt yet to start new  PT sessions. \par \par PLAN;\par Start PT for back and legs  for strengthening and modalities , 2-3 times/week x 8 weeks. \par If no change in symptoms,  will consider imaging.\par Return in 3 months.\par \par \par

## 2022-08-25 RX ORDER — MELOXICAM 15 MG/1
15 TABLET ORAL DAILY
Qty: 30 | Refills: 4 | Status: ACTIVE | COMMUNITY
Start: 2022-08-25 | End: 1900-01-01

## 2022-09-15 DIAGNOSIS — R20.0 ANESTHESIA OF SKIN: ICD-10-CM

## 2022-09-15 RX ORDER — BACLOFEN 10 MG/1
10 TABLET ORAL 3 TIMES DAILY
Qty: 30 | Refills: 0 | Status: ACTIVE | COMMUNITY
Start: 2022-09-15 | End: 1900-01-01

## 2022-09-16 ENCOUNTER — NON-APPOINTMENT (OUTPATIENT)
Age: 67
End: 2022-09-16

## 2022-09-22 ENCOUNTER — NON-APPOINTMENT (OUTPATIENT)
Age: 67
End: 2022-09-22

## 2022-09-23 RX ORDER — BACLOFEN 10 MG/1
10 TABLET ORAL 3 TIMES DAILY
Qty: 60 | Refills: 0 | Status: ACTIVE | COMMUNITY
Start: 2022-09-23 | End: 1900-01-01

## 2022-10-05 ENCOUNTER — APPOINTMENT (OUTPATIENT)
Dept: MRI IMAGING | Facility: CLINIC | Age: 67
End: 2022-10-05

## 2022-10-21 ENCOUNTER — NON-APPOINTMENT (OUTPATIENT)
Age: 67
End: 2022-10-21

## 2022-10-24 ENCOUNTER — NON-APPOINTMENT (OUTPATIENT)
Age: 67
End: 2022-10-24

## 2022-10-26 ENCOUNTER — APPOINTMENT (OUTPATIENT)
Dept: MRI IMAGING | Facility: CLINIC | Age: 67
End: 2022-10-26

## 2022-12-08 NOTE — HISTORY OF PRESENT ILLNESS
[FreeTextEntry1] : Pt 64 y/o male previously treated for L5- S1 herniated disc in Nov 2011 at [facility]. Was supposed to have surgery on spine in 2016, but deferred it and dong conservative management.  Pt reports that his pain goes from the left foot radiating to up and goes to his left hip.  Pt take Meloxicam 15 mg and Methadone 55 mg with relief. Pt is not interested in surgery at all as he was seen pt suffering after back surgery. Pt had worsening of neck pain in Sep 2022 and was ordered MRI C spine.

## 2022-12-09 ENCOUNTER — APPOINTMENT (OUTPATIENT)
Dept: NEUROSURGERY | Facility: CLINIC | Age: 67
End: 2022-12-09